# Patient Record
Sex: FEMALE | Race: WHITE | NOT HISPANIC OR LATINO | URBAN - METROPOLITAN AREA
[De-identification: names, ages, dates, MRNs, and addresses within clinical notes are randomized per-mention and may not be internally consistent; named-entity substitution may affect disease eponyms.]

---

## 2019-01-08 ENCOUNTER — OFFICE VISIT (OUTPATIENT)
Dept: URBAN - METROPOLITAN AREA CLINIC 32 | Facility: CLINIC | Age: 74
End: 2019-01-08
Payer: COMMERCIAL

## 2019-01-08 PROCEDURE — 99213 OFFICE O/P EST LOW 20 MIN: CPT | Performed by: OPTOMETRIST

## 2019-01-08 ASSESSMENT — INTRAOCULAR PRESSURE
OS: 22
OD: 25

## 2019-01-08 NOTE — IMPRESSION/PLAN
Impression: Dry Eye Syndrome: K41.449. Plan: Dry eyes account for the patient's complaints. There is no evidence of permanent changes to the cornea. Explained condition does not have a cure and will need artificial tears for maintenance.

## 2019-01-18 ENCOUNTER — OFFICE VISIT (OUTPATIENT)
Dept: URBAN - METROPOLITAN AREA CLINIC 32 | Facility: CLINIC | Age: 74
End: 2019-01-18
Payer: COMMERCIAL

## 2019-01-18 PROCEDURE — 99213 OFFICE O/P EST LOW 20 MIN: CPT | Performed by: OPTOMETRIST

## 2019-01-18 RX ORDER — GANCICLOVIR 1.5 MG/G
0.15 % GEL OPHTHALMIC
Qty: 1 | Refills: 1 | Status: INACTIVE
Start: 2019-01-18 | End: 2019-02-06

## 2019-01-18 ASSESSMENT — INTRAOCULAR PRESSURE
OS: 24
OD: 26

## 2019-01-18 NOTE — IMPRESSION/PLAN
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  Plan: ZIrgan 5x/day AT PRN, no steroid use, PT ed on condition RTC 1 week

## 2019-01-18 NOTE — IMPRESSION/PLAN
Impression: Dry Eye Syndrome: U37.975. Plan: Dry eyes account for the patient's complaints. There is no evidence of permanent changes to the cornea. Explained condition does not have a cure and will need artificial tears for maintenance.

## 2019-01-22 ENCOUNTER — OFFICE VISIT (OUTPATIENT)
Dept: URBAN - METROPOLITAN AREA CLINIC 32 | Facility: CLINIC | Age: 74
End: 2019-01-22
Payer: COMMERCIAL

## 2019-01-22 PROCEDURE — 99213 OFFICE O/P EST LOW 20 MIN: CPT | Performed by: OPTOMETRIST

## 2019-01-22 ASSESSMENT — INTRAOCULAR PRESSURE
OD: 20
OS: 19

## 2019-01-22 NOTE — IMPRESSION/PLAN
Impression: Dry Eye Syndrome: Q82.307. Plan: Dry eyes account for the patient's complaints. There is no evidence of permanent changes to the cornea. Explained condition does not have a cure and will need artificial tears for maintenance.

## 2019-02-06 ENCOUNTER — OFFICE VISIT (OUTPATIENT)
Dept: URBAN - METROPOLITAN AREA CLINIC 32 | Facility: CLINIC | Age: 74
End: 2019-02-06
Payer: COMMERCIAL

## 2019-02-06 PROCEDURE — 99213 OFFICE O/P EST LOW 20 MIN: CPT | Performed by: OPTOMETRIST

## 2019-02-06 RX ORDER — GANCICLOVIR 1.5 MG/G
0.15 % GEL OPHTHALMIC
Qty: 1 | Refills: 1 | Status: INACTIVE
Start: 2019-02-06 | End: 2019-03-20

## 2019-02-06 ASSESSMENT — INTRAOCULAR PRESSURE
OD: 10
OS: 12

## 2019-02-06 NOTE — IMPRESSION/PLAN
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  Plan: ZIrgan 3x/day AT PRN, no steroid use, PT ed on condition RTC 1 week

## 2019-02-06 NOTE — IMPRESSION/PLAN
Impression: Dry Eye Syndrome: P88.375. Plan: Dry eyes account for the patient's complaints. There is no evidence of permanent changes to the cornea. Explained condition does not have a cure and will need artificial tears for maintenance.

## 2019-02-20 ENCOUNTER — OFFICE VISIT (OUTPATIENT)
Dept: URBAN - METROPOLITAN AREA CLINIC 32 | Facility: CLINIC | Age: 74
End: 2019-02-20
Payer: COMMERCIAL

## 2019-02-20 DIAGNOSIS — H04.123 DRY EYE SYNDROME: ICD-10-CM

## 2019-02-20 PROCEDURE — 99213 OFFICE O/P EST LOW 20 MIN: CPT | Performed by: OPTOMETRIST

## 2019-02-20 RX ORDER — DUREZOL 0.5 MG/ML
0.05 % EMULSION OPHTHALMIC
Qty: 5 | Refills: 1 | Status: INACTIVE
Start: 2019-02-20 | End: 2019-03-21

## 2019-02-20 RX ORDER — CENEGERMIN-BKBJ 20 UG/ML
0.002 % SOLUTION/ DROPS OPHTHALMIC
Qty: 1 | Refills: 1 | Status: INACTIVE
Start: 2019-02-20 | End: 2019-02-26

## 2019-02-20 ASSESSMENT — INTRAOCULAR PRESSURE
OS: 12
OD: 16

## 2019-02-20 NOTE — IMPRESSION/PLAN
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  Plan: ZIrgan 2x/day AT PRN, durezol BID OS AT PRN

## 2019-02-20 NOTE — IMPRESSION/PLAN
Impression: Dry Eye Syndrome: C30.924. Plan: Dry eyes account for the patient's complaints. There is no evidence of permanent changes to the cornea. Explained condition does not have a cure and will need artificial tears for maintenance.

## 2019-02-27 ENCOUNTER — OFFICE VISIT (OUTPATIENT)
Dept: URBAN - METROPOLITAN AREA CLINIC 32 | Facility: CLINIC | Age: 74
End: 2019-02-27
Payer: COMMERCIAL

## 2019-02-27 PROCEDURE — 99213 OFFICE O/P EST LOW 20 MIN: CPT | Performed by: OPTOMETRIST

## 2019-02-27 ASSESSMENT — INTRAOCULAR PRESSURE
OD: 18
OS: 17

## 2019-02-27 NOTE — IMPRESSION/PLAN
Impression: Dry Eye Syndrome: D57.753. Plan: Dry eyes account for the patient's complaints. There is no evidence of permanent changes to the cornea. Explained condition does not have a cure and will need artificial tears for maintenance.

## 2019-02-27 NOTE — IMPRESSION/PLAN
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  Plan: ZIrgan taper AT PRN, durezol QD OS AT PRN

## 2019-03-20 ENCOUNTER — OFFICE VISIT (OUTPATIENT)
Dept: URBAN - METROPOLITAN AREA CLINIC 32 | Facility: CLINIC | Age: 74
End: 2019-03-20
Payer: COMMERCIAL

## 2019-03-20 DIAGNOSIS — B00.52 HERPESVIRAL KERATOCONJUNCTIVITIS: ICD-10-CM

## 2019-03-20 PROCEDURE — 99213 OFFICE O/P EST LOW 20 MIN: CPT | Performed by: OPTOMETRIST

## 2019-03-20 ASSESSMENT — INTRAOCULAR PRESSURE
OD: 18
OS: 19

## 2019-03-20 NOTE — IMPRESSION/PLAN
Impression: Dry Eye Syndrome: U27.139. Plan: Dry eyes account for the patient's complaints. There is no evidence of permanent changes to the cornea. Explained condition does not have a cure and will need artificial tears for maintenance.

## 2019-09-18 ENCOUNTER — OFFICE VISIT (OUTPATIENT)
Dept: URBAN - METROPOLITAN AREA CLINIC 32 | Facility: CLINIC | Age: 74
End: 2019-09-18
Payer: COMMERCIAL

## 2019-09-18 PROCEDURE — 92012 INTRM OPH EXAM EST PATIENT: CPT | Performed by: OPTOMETRIST

## 2019-09-18 ASSESSMENT — INTRAOCULAR PRESSURE
OS: 15
OD: 16

## 2019-09-18 NOTE — IMPRESSION/PLAN
Impression: Dry Eye Syndrome: M43.222. Plan: Dry eyes account for the patient's complaints. There is no evidence of permanent changes to the cornea. Explained condition does not have a cure and will need artificial tears for maintenance.

## 2019-09-26 ENCOUNTER — OFFICE VISIT (OUTPATIENT)
Dept: URBAN - METROPOLITAN AREA CLINIC 32 | Facility: CLINIC | Age: 74
End: 2019-09-26
Payer: COMMERCIAL

## 2019-09-26 DIAGNOSIS — H02.834 DERMATOCHALASIS OF LEFT UPPER EYELID: Chronic | ICD-10-CM

## 2019-09-26 DIAGNOSIS — H02.831 DERMATOCHALASIS OF RIGHT UPPER EYELID: Primary | Chronic | ICD-10-CM

## 2019-09-26 PROCEDURE — 92012 INTRM OPH EXAM EST PATIENT: CPT | Performed by: OPHTHALMOLOGY

## 2019-09-26 PROCEDURE — 92002 INTRM OPH EXAM NEW PATIENT: CPT | Performed by: OPHTHALMOLOGY

## 2019-09-26 NOTE — IMPRESSION/PLAN
Impression: Dermatochalasis of right upper eyelid: H02.831. OD. Condition: established, stable. Symptoms: may improve with surgery. Vision: vision not threatened. Plan: Discussed diagnosis in detail with patient. Discussed treatment options with patient. No treatment is required at this time, dermatochalasis is mild/moderate and not yet affecting vision. Patient may opt for cosmetic surgery, patient elects to proceed. Surgical treatment is recommended. Surgical risks and benefits were discussed, explained and understood by patient. Recommend Cosmetic Bilateral Upper Eyelid Blepharoplasty. Rl2, avoid Aleve 7 days prior to surgery.

## 2019-09-26 NOTE — IMPRESSION/PLAN
Impression: Dermatochalasis of left upper eyelid: H02.834. OS. Condition: established, stable. Symptoms: may improve with surgery. Vision: vision not threatened. Plan: Discussion, surgery orders, risk level and pre-op instructions listed in plan #1.

## 2019-11-01 ENCOUNTER — SURGERY (OUTPATIENT)
Dept: URBAN - METROPOLITAN AREA SURGERY 11 | Facility: SURGERY | Age: 74
End: 2019-11-01
Payer: COMMERCIAL

## 2019-11-07 ENCOUNTER — POST-OPERATIVE VISIT (OUTPATIENT)
Dept: URBAN - METROPOLITAN AREA CLINIC 32 | Facility: CLINIC | Age: 74
End: 2019-11-07

## 2019-11-07 DIAGNOSIS — Z09 ENCNTR FOR F/U EXAM AFT TRTMT FOR COND OTH THAN MALIG NEOPLM: Primary | ICD-10-CM

## 2019-11-07 PROCEDURE — 99024 POSTOP FOLLOW-UP VISIT: CPT | Performed by: OPHTHALMOLOGY

## 2019-11-07 ASSESSMENT — INTRAOCULAR PRESSURE
OS: 19
OD: 18

## 2019-11-21 ENCOUNTER — POST-OPERATIVE VISIT (OUTPATIENT)
Dept: URBAN - METROPOLITAN AREA CLINIC 32 | Facility: CLINIC | Age: 74
End: 2019-11-21

## 2019-11-21 PROCEDURE — 99024 POSTOP FOLLOW-UP VISIT: CPT | Performed by: OPHTHALMOLOGY

## 2020-01-29 ENCOUNTER — OFFICE VISIT (OUTPATIENT)
Dept: URBAN - METROPOLITAN AREA CLINIC 32 | Facility: CLINIC | Age: 75
End: 2020-01-29
Payer: COMMERCIAL

## 2020-01-29 DIAGNOSIS — H52.4 PRESBYOPIA: Primary | ICD-10-CM

## 2020-01-29 PROCEDURE — 92014 COMPRE OPH EXAM EST PT 1/>: CPT | Performed by: OPTOMETRIST

## 2020-01-29 ASSESSMENT — INTRAOCULAR PRESSURE
OS: 15
OD: 17

## 2020-01-29 ASSESSMENT — VISUAL ACUITY
OD: 20/30
OS: 20/25

## 2020-01-29 NOTE — IMPRESSION/PLAN
Impression: Dry Eye Syndrome: A87.062. Plan: Dry eyes account for the patient's complaints. There is no evidence of permanent changes to the cornea. Explained condition does not have a cure and will need artificial tears for maintenance.

## 2020-01-29 NOTE — IMPRESSION/PLAN
Impression: Dry Eye Syndrome: J69.670. Plan: Dry eyes account for the patient's complaints. There is no evidence of permanent changes to the cornea. Explained condition does not have a cure and will need artificial tears for maintenance.

## 2021-02-19 ENCOUNTER — OFFICE VISIT (OUTPATIENT)
Dept: URBAN - METROPOLITAN AREA CLINIC 32 | Facility: CLINIC | Age: 76
End: 2021-02-19
Payer: COMMERCIAL

## 2021-02-19 PROCEDURE — 92014 COMPRE OPH EXAM EST PT 1/>: CPT | Performed by: OPTOMETRIST

## 2021-02-19 ASSESSMENT — VISUAL ACUITY
OS: 20/30
OD: 20/30

## 2021-02-19 ASSESSMENT — INTRAOCULAR PRESSURE
OD: 23
OS: 20

## 2021-02-19 ASSESSMENT — KERATOMETRY
OS: 46.13
OD: 45.38

## 2023-02-08 ENCOUNTER — OFFICE VISIT (OUTPATIENT)
Dept: URBAN - METROPOLITAN AREA CLINIC 32 | Facility: CLINIC | Age: 78
End: 2023-02-08
Payer: COMMERCIAL

## 2023-02-08 DIAGNOSIS — H52.4 PRESBYOPIA: Primary | ICD-10-CM

## 2023-02-08 DIAGNOSIS — H04.123 DRY EYE SYNDROME OF BILATERAL LACRIMAL GLANDS: ICD-10-CM

## 2023-02-08 PROCEDURE — 92014 COMPRE OPH EXAM EST PT 1/>: CPT | Performed by: OPTOMETRIST

## 2023-02-08 RX ORDER — LIFITEGRAST 50 MG/ML
5 % SOLUTION/ DROPS OPHTHALMIC
Qty: 180 | Refills: 12 | Status: INACTIVE
Start: 2023-02-08 | End: 2023-05-08

## 2023-02-08 ASSESSMENT — KERATOMETRY
OD: 45.50
OS: 45.88

## 2023-02-08 ASSESSMENT — INTRAOCULAR PRESSURE
OS: 20
OD: 24

## 2023-02-08 ASSESSMENT — VISUAL ACUITY
OS: 20/25
OD: 20/40

## 2024-08-28 ENCOUNTER — OFFICE VISIT (OUTPATIENT)
Dept: URBAN - METROPOLITAN AREA CLINIC 32 | Facility: CLINIC | Age: 79
End: 2024-08-28
Payer: COMMERCIAL

## 2024-08-28 DIAGNOSIS — Z96.1 PRESENCE OF INTRAOCULAR LENS: ICD-10-CM

## 2024-08-28 DIAGNOSIS — H52.4 PRESBYOPIA: Primary | ICD-10-CM

## 2024-08-28 PROCEDURE — 92014 COMPRE OPH EXAM EST PT 1/>: CPT | Performed by: OPTOMETRIST

## 2024-08-28 ASSESSMENT — VISUAL ACUITY
OS: 20/25
OD: 20/25

## 2024-08-28 ASSESSMENT — INTRAOCULAR PRESSURE
OD: 20
OS: 21

## 2024-08-28 ASSESSMENT — KERATOMETRY
OD: 46.38
OS: 46.88